# Patient Record
Sex: MALE | Race: WHITE
[De-identification: names, ages, dates, MRNs, and addresses within clinical notes are randomized per-mention and may not be internally consistent; named-entity substitution may affect disease eponyms.]

---

## 2022-12-08 PROBLEM — Z00.00 ENCOUNTER FOR PREVENTIVE HEALTH EXAMINATION: Status: ACTIVE | Noted: 2022-12-08

## 2022-12-12 ENCOUNTER — APPOINTMENT (OUTPATIENT)
Dept: NEUROSURGERY | Facility: CLINIC | Age: 32
End: 2022-12-12

## 2022-12-12 ENCOUNTER — NON-APPOINTMENT (OUTPATIENT)
Age: 32
End: 2022-12-12

## 2022-12-12 VITALS
HEART RATE: 67 BPM | TEMPERATURE: 97 F | OXYGEN SATURATION: 98 % | HEIGHT: 69 IN | BODY MASS INDEX: 22.96 KG/M2 | SYSTOLIC BLOOD PRESSURE: 142 MMHG | RESPIRATION RATE: 18 BRPM | WEIGHT: 155 LBS | DIASTOLIC BLOOD PRESSURE: 80 MMHG

## 2022-12-12 DIAGNOSIS — Z78.9 OTHER SPECIFIED HEALTH STATUS: ICD-10-CM

## 2022-12-12 DIAGNOSIS — M54.50 LOW BACK PAIN, UNSPECIFIED: ICD-10-CM

## 2022-12-12 PROCEDURE — 99204 OFFICE O/P NEW MOD 45 MIN: CPT

## 2022-12-13 PROBLEM — M54.50 LOW BACK PAIN: Status: ACTIVE | Noted: 2022-12-13

## 2022-12-13 PROBLEM — Z78.9 NO PERTINENT PAST MEDICAL HISTORY: Status: RESOLVED | Noted: 2022-12-13 | Resolved: 2022-12-13

## 2022-12-13 NOTE — PHYSICAL EXAM
[General Appearance - Alert] : alert [General Appearance - In No Acute Distress] : in no acute distress [Oriented To Time, Place, And Person] : oriented to person, place, and time [Motor Tone] : muscle tone was normal in all four extremities [Motor Strength] : muscle strength was normal in all four extremities [No Visual Abnormalities] : no visible abnormailities [Sclera] : the sclera and conjunctiva were normal [Outer Ear] : the ears and nose were normal in appearance [Neck Appearance] : the appearance of the neck was normal [] : no respiratory distress [Abnormal Walk] : normal gait [Skin Color & Pigmentation] : normal skin color and pigmentation

## 2022-12-20 NOTE — HISTORY OF PRESENT ILLNESS
[de-identified] : 32 year old man with history of spinal tumor s/p resection in 2012 in [place] and re-resection in 2017 in [place] who presents today to review MRI lumbar spine from 2021.\par \par Mr. Miranda states he developed severe low back pain radiating to bilateral extremities in 2012. After several months, the pain progressed and he also reported progressive weakness in bilateral lower extremities in which he was unable to ambulate. At that time, he was found to have a tumor at L3-4 and underwent subtotal resection by a surgeon in [place] in 2012. 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 Following that surgery, he states he made a full recovery and regained function. He returned to routine activities.\par \par In 2017, he developed similar symptoms including back pain radiating to bilateral lower extremities and difficulty walking. He then underwent re-resection in [place]. 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 Following this surgery, he reports improvement in symptoms and he returned to his functional baseline.\par \par He was instructed to obtain MRI lumbar spine annually to evaluate for recurrent tumor. His last MRI lumbar spine that he brings today for review is from 3/2021. He has not reviewed imaging as of yet.\par \par At present, he denies radiating leg pain, numbness/tingling, weakness. Denies urinary/bowel incontinence. He reports occasional low back pain, which is tolerable. He is at his functional baseline and is able to work out and exercise without difficulty.\par \par He has no further recent imaging. He has not had MRI brain or total spine.

## 2022-12-20 NOTE — ASSESSMENT
[FreeTextEntry1] : MRI lumbar spine with and without contrast done on 3/2021 reviewed by Dr. Nguyễn demonstrates residual tumor at L4.\par Recommend repeat MRI lumbar spine with and without contrast to evaluate stability of residual tumor.\par He was also instructed to send surgical pathology reports for review.\par Reviewed ependymoma as a disease process. \par We discussed that ependymomas are primary tumors in the CNS and therefore, can also be found in other areas of the spine/brain\par Also recommend MRI brain, cervical and thoracic with and without contrast to rule out additional tumors.\par \par After above imaging complete, RTO to review.\par \par Patient and patient's family verbalize agreement and understanding with plan of care.\par \par I, Dr. Nguyễn, personally performed the evaluation and management (E/M) services for this new patient.  That E/M includes conducting the initial examination, assessing all conditions, and establishing the plan of care.  Today, my ACP, Sia Mann, was here to observe my evaluation and management services for this patient to be followed going forward.\par \par \par

## 2023-01-30 ENCOUNTER — APPOINTMENT (OUTPATIENT)
Dept: NEUROSURGERY | Facility: CLINIC | Age: 33
End: 2023-01-30
Payer: SELF-PAY

## 2023-02-13 ENCOUNTER — NON-APPOINTMENT (OUTPATIENT)
Age: 33
End: 2023-02-13

## 2023-02-13 ENCOUNTER — APPOINTMENT (OUTPATIENT)
Dept: NEUROSURGERY | Facility: CLINIC | Age: 33
End: 2023-02-13
Payer: SELF-PAY

## 2023-02-13 VITALS
WEIGHT: 155 LBS | HEART RATE: 69 BPM | SYSTOLIC BLOOD PRESSURE: 121 MMHG | RESPIRATION RATE: 18 BRPM | DIASTOLIC BLOOD PRESSURE: 85 MMHG | HEIGHT: 69 IN | OXYGEN SATURATION: 98 % | BODY MASS INDEX: 22.96 KG/M2 | TEMPERATURE: 98 F

## 2023-02-13 DIAGNOSIS — C71.9 MALIGNANT NEOPLASM OF BRAIN, UNSPECIFIED: ICD-10-CM

## 2023-02-13 PROCEDURE — 99214 OFFICE O/P EST MOD 30 MIN: CPT

## 2023-02-14 PROBLEM — C71.9 EPENDYMOMA: Status: ACTIVE | Noted: 2022-12-12

## 2023-02-21 NOTE — ASSESSMENT
[FreeTextEntry1] : MRI cervical spine from 1/5/23 reviewed by Dr. Nguyễn with patient demonstrates no underlying lesion.\par MRI lumbar spine from 1/5/23 reviewed by Dr. Nguyễn with patient demonstrates intradural lobulated mass of enhancement at L3 level consistent withy residual tumor. Compared with prior MRI lumbar spine, residual appears stable. He is neurologically intact on exam\par Recommend repeat MRI lumbar spine in 6 months to evaluate stability (July 2023). Instructed to call the office in June 2023 to schedule.\par \par Patient verbalizes agreement and understanding with plan of care.\par \par I, Dr. Nguyễn, personally performed the evaluation and management (E/M) services for this established patient who presents today with (a) new problem(s)/exacerbation of (an) existing condition(s).  That E/M includes conducting the examination, assessing all new/exacerbated conditions, and establishing a new plan of care.  Today, my ACP, Sia Mann, was here to observe my evaluation and management services for this new problem/exacerbated condition to be followed going forward.\par \par

## 2023-02-21 NOTE — HISTORY OF PRESENT ILLNESS
[de-identified] : 32 year old man with history of spinal tumor s/p resection in 2012 in [place] and re-resection in 2017 in [place] who presents today to review MRI lumbar spine from 2021.\par \par Mr. Miranda states he developed severe low back pain radiating to bilateral extremities in 2012. After several months, the pain progressed and he also reported progressive weakness in bilateral lower extremities in which he was unable to ambulate. At that time, he was found to have a tumor at L3-4 and underwent subtotal resection by a surgeon in [place] in 2012. 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 Following that surgery, he states he made a full recovery and regained function. He returned to routine activities.\par \par In 2017, he developed similar symptoms including back pain radiating to bilateral lower extremities and difficulty walking. He then underwent re-resection in [place]. 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 Following this surgery, he reports improvement in symptoms and he returned to his functional baseline.\par \par He was instructed to obtain MRI lumbar spine annually to evaluate for recurrent tumor. His last MRI lumbar spine that he brings today for review is from 3/2021. He has not reviewed imaging as of yet.\par \par At present, he denies radiating leg pain, numbness/tingling, weakness. Denies urinary/bowel incontinence. He reports occasional low back pain, which is tolerable. He is at his functional baseline and is able to work out and exercise without difficulty.\par \par Returns to review MRI cervical and lumbar spine with and without contrast. He did not complete MRI thoracic and MRI head.\par He denies neurological symptoms.\par \par He does bring prior MRI lumbar spine for comparison.

## 2023-02-21 NOTE — DATA REVIEWED
[de-identified] : Exam requested by:\par ANA GUAN MD\par [address], [address]\par [facility] [address]\par SITE PERFORMED: [facility]\par SITE PHONE: [phone]\par Patient: EDDY GRIGGS\par YOB: 1990\par Phone: [phone]\par MRN: [id number] Acc: [id number]\par Date of Exam: 01-\par  \par EXAM:  MRI CERVICAL SPINE WITHOUT AND WITH CONTRAST\par \par HISTORY:  Status post ependymoma resection.\par \par TECHNIQUE:  Multiplanar, multi-sequential MRI of the cervical spine was obtained on a 3T scanner using a standard protocol.\par \par IV Contrast:  16 ml of Clariscan was injected from a 20 ml single use vial.\par \par COMPARISON:  None.\par \par FINDINGS: \par OSSEOUS STRUCTURES: Vertebral body heights are preserved. No marrow edema, abnormal enhancement or destructive marrow infiltrative process.\par \par ALIGNMENT: Normal cervical lordosis is preserved. There partially imaged leftward curvature of the upper thoracic spine. No spondylolisthesis.\par \par SPINAL CORD: Normal cervical cord signal, without abnormal enhancement.\par \par POSTERIOR FOSSA/CERVICOMEDULLARY JUNCTION: Normal.\par \par NECK/PARASPINAL SOFT TISSUES: Unremarkable.\par \par INCLUDED THORACIC SPINE: There is a benign T2 vertebral body 0.7 cm hemangioma.\par \par DISCS: Normal height and signal.\par \par The following axial levels are imaged and detailed below:\par \par C2-C3: No disc bulging or herniation. No spinal canal or foraminal stenosis.\par \par C3-C4: No disc bulging or herniation. No spinal canal or foraminal stenosis.\par \par C4-C5: No disc bulging or herniation. No spinal canal or foraminal stenosis.\par \par C5-C6: No disc bulging or herniation. No spinal canal or foraminal stenosis.\par \par C6-C7: No disc bulging or herniation. No spinal canal or foraminal stenosis.\par \par C7-T1: No disc bulging or herniation. No spinal canal or foraminal stenosis.\par \par IMPRESSION:  \par Normal postcontrast MRI of the cervical spine.\par \par Partial imaged leftward curvature of the upper thoracic spine.\par \par Thank you for the opportunity to participate in the care of this patient.  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 No marrow edema or destructive marrow infiltrative process. \par \par ALIGNMENT: Normal lumbar lordosis is preserved.  No significant scoliosis. No spondylolisthesis. No spondylolysis within the limitations of MRI.\par \par SPINAL CORD AND CONUS MEDULLARIS: There is a 1.1 x 1.2 x 2.2 cm (transverse, anteroposterior and caudocephalad dimensions) multilobulated intradural mass of T2 hyperintense signal and homogeneous enhancement interspersed among the nerve roots of the cauda equina at the L3 level, consistent with residual, recurrent submitted history of ependymoma, with differential diagnosis of peripheral sheath tumor (such as schwannoma, neurofibroma). There is no abnormal signal or enhancement of the conus medullaris terminating at the L1-L2 disc level. There is mild clumping of the nerve roots of the cauda equina L4 level, and peripherally displaced from L4 level to the sacral canal, empty thecal sac, most likely related to chronic arachnoiditis rather than intrathecal arachnoid cyst.\par \par PARASPINAL AND INTRA-ABDOMINAL SOFT TISSUES: Unremarkable.\par \par INCLUDED THORACIC SPINE AND SACRUM: Unremarkable.\par \par DISCS: Normal height and signal.\par \par The following axial levels are imaged and detailed below:\par \par L1-L2: No disc bulging or herniation. No spinal canal or foraminal stenosis.\par \par L2-L3: No disc bulging or herniation. No spinal canal or foraminal stenosis. \par \par L3-L4: No disc bulging or herniation. No spinal canal or foraminal stenosis. Chronic laminectomy defect, with mild focal bulging of the posterior thecal sac at L3 level consistent with small pseudomeningocele.\par \par L4-L5: No disc bulging or herniation. No spinal canal or foraminal stenosis.\par \par L5-S1: No disc bulging or herniation. No spinal canal or foraminal stenosis.\par \par IMPRESSION:  \par 1.1 x 1.2 x 2.2 cm intradural lobulated mass of homogeneous enhancement interspersed among the nerve roots of the cauda equina at L3 level consistent with residual, recurrent submitted history of ependymoma, with differential diagnosis of peripheral sheath tumor (schwannoma, neurofibroma). Would be helpful to correlate with prior MRI studies to assess for changes.\par \par Mild clumping of the nerve roots of the cauda equina at L4 level and peripherally displaced inferiorly into the sacral canal, consistent with chronic arachnoiditis.\par \par Chronic L3-L4 laminectomy with small pseudomeningocele.\par \par No lumbar spine disc herniation, spinal canal or foraminal stenosis.\par \par \par \par Thank you for the opportunity to participate in the care of this patient.  \par  \par ERNESTINE LOWE MD  - Electronically Signed: 01- 12:18 PM \par Physician to Physician Direct Line is: \par